# Patient Record
Sex: FEMALE | Race: BLACK OR AFRICAN AMERICAN | NOT HISPANIC OR LATINO | URBAN - METROPOLITAN AREA
[De-identification: names, ages, dates, MRNs, and addresses within clinical notes are randomized per-mention and may not be internally consistent; named-entity substitution may affect disease eponyms.]

---

## 2017-05-14 ENCOUNTER — EMERGENCY (EMERGENCY)
Facility: HOSPITAL | Age: 82
LOS: 1 days | Discharge: HOME | End: 2017-05-14
Attending: EMERGENCY MEDICINE
Payer: MEDICARE

## 2017-05-14 VITALS
DIASTOLIC BLOOD PRESSURE: 74 MMHG | SYSTOLIC BLOOD PRESSURE: 161 MMHG | OXYGEN SATURATION: 99 % | RESPIRATION RATE: 18 BRPM | HEART RATE: 82 BPM

## 2017-05-14 VITALS
SYSTOLIC BLOOD PRESSURE: 177 MMHG | RESPIRATION RATE: 22 BRPM | DIASTOLIC BLOOD PRESSURE: 95 MMHG | HEART RATE: 105 BPM | WEIGHT: 126.99 LBS | HEIGHT: 59 IN | TEMPERATURE: 98 F | OXYGEN SATURATION: 98 %

## 2017-05-14 DIAGNOSIS — R00.2 PALPITATIONS: ICD-10-CM

## 2017-05-14 DIAGNOSIS — I48.91 UNSPECIFIED ATRIAL FIBRILLATION: ICD-10-CM

## 2017-05-14 DIAGNOSIS — I10 ESSENTIAL (PRIMARY) HYPERTENSION: ICD-10-CM

## 2017-05-14 DIAGNOSIS — Z79.899 OTHER LONG TERM (CURRENT) DRUG THERAPY: ICD-10-CM

## 2017-05-14 LAB
ALBUMIN SERPL ELPH-MCNC: 4.2 G/DL — SIGNIFICANT CHANGE UP (ref 3.3–5.2)
ALP SERPL-CCNC: 73 U/L — SIGNIFICANT CHANGE UP (ref 40–120)
ALT FLD-CCNC: 20 U/L — SIGNIFICANT CHANGE UP
ANION GAP SERPL CALC-SCNC: 15 MMOL/L — SIGNIFICANT CHANGE UP (ref 5–17)
APTT BLD: 35.1 SEC — SIGNIFICANT CHANGE UP (ref 27.5–37.4)
AST SERPL-CCNC: 26 U/L — SIGNIFICANT CHANGE UP
BASOPHILS # BLD AUTO: 0 K/UL — SIGNIFICANT CHANGE UP (ref 0–0.2)
BASOPHILS NFR BLD AUTO: 0.2 % — SIGNIFICANT CHANGE UP (ref 0–2)
BILIRUB SERPL-MCNC: 0.4 MG/DL — SIGNIFICANT CHANGE UP (ref 0.4–2)
BUN SERPL-MCNC: 16 MG/DL — SIGNIFICANT CHANGE UP (ref 8–20)
CALCIUM SERPL-MCNC: 9.8 MG/DL — SIGNIFICANT CHANGE UP (ref 8.6–10.2)
CHLORIDE SERPL-SCNC: 104 MMOL/L — SIGNIFICANT CHANGE UP (ref 98–107)
CK SERPL-CCNC: 89 U/L — SIGNIFICANT CHANGE UP (ref 25–170)
CO2 SERPL-SCNC: 22 MMOL/L — SIGNIFICANT CHANGE UP (ref 22–29)
CREAT SERPL-MCNC: 0.83 MG/DL — SIGNIFICANT CHANGE UP (ref 0.5–1.3)
D DIMER BLD IA.RAPID-MCNC: <150 NG/ML DDU — SIGNIFICANT CHANGE UP
EOSINOPHIL # BLD AUTO: 0 K/UL — SIGNIFICANT CHANGE UP (ref 0–0.5)
EOSINOPHIL NFR BLD AUTO: 0.4 % — SIGNIFICANT CHANGE UP (ref 0–6)
GLUCOSE SERPL-MCNC: 144 MG/DL — HIGH (ref 70–115)
HCT VFR BLD CALC: 45.2 % — SIGNIFICANT CHANGE UP (ref 37–47)
HGB BLD-MCNC: 15.7 G/DL — SIGNIFICANT CHANGE UP (ref 12–16)
INR BLD: 1.15 RATIO — SIGNIFICANT CHANGE UP (ref 0.88–1.16)
LYMPHOCYTES # BLD AUTO: 1.3 K/UL — SIGNIFICANT CHANGE UP (ref 1–4.8)
LYMPHOCYTES # BLD AUTO: 15.9 % — LOW (ref 20–55)
MCHC RBC-ENTMCNC: 32.3 PG — HIGH (ref 27–31)
MCHC RBC-ENTMCNC: 34.7 G/DL — SIGNIFICANT CHANGE UP (ref 32–36)
MCV RBC AUTO: 93 FL — SIGNIFICANT CHANGE UP (ref 81–99)
MONOCYTES # BLD AUTO: 0.8 K/UL — SIGNIFICANT CHANGE UP (ref 0–0.8)
MONOCYTES NFR BLD AUTO: 9.5 % — SIGNIFICANT CHANGE UP (ref 3–10)
NEUTROPHILS # BLD AUTO: 6.2 K/UL — SIGNIFICANT CHANGE UP (ref 1.8–8)
NEUTROPHILS NFR BLD AUTO: 73.8 % — HIGH (ref 37–73)
NT-PROBNP SERPL-SCNC: 311 PG/ML — HIGH (ref 0–300)
PLATELET # BLD AUTO: 171 K/UL — SIGNIFICANT CHANGE UP (ref 150–400)
POTASSIUM SERPL-MCNC: 4.2 MMOL/L — SIGNIFICANT CHANGE UP (ref 3.5–5.3)
POTASSIUM SERPL-SCNC: 4.2 MMOL/L — SIGNIFICANT CHANGE UP (ref 3.5–5.3)
PROT SERPL-MCNC: 7.9 G/DL — SIGNIFICANT CHANGE UP (ref 6.6–8.7)
PROTHROM AB SERPL-ACNC: 12.7 SEC — SIGNIFICANT CHANGE UP (ref 9.8–12.7)
RBC # BLD: 4.86 M/UL — SIGNIFICANT CHANGE UP (ref 4.4–5.2)
RBC # FLD: 13.2 % — SIGNIFICANT CHANGE UP (ref 11–15.6)
SODIUM SERPL-SCNC: 141 MMOL/L — SIGNIFICANT CHANGE UP (ref 135–145)
TROPONIN T SERPL-MCNC: <0.01 NG/ML — SIGNIFICANT CHANGE UP (ref 0–0.06)
TROPONIN T SERPL-MCNC: <0.01 NG/ML — SIGNIFICANT CHANGE UP (ref 0–0.06)
TSH SERPL-MCNC: 1.13 UIU/ML — SIGNIFICANT CHANGE UP (ref 0.27–4.2)
WBC # BLD: 8.4 K/UL — SIGNIFICANT CHANGE UP (ref 4.8–10.8)
WBC # FLD AUTO: 8.4 K/UL — SIGNIFICANT CHANGE UP (ref 4.8–10.8)

## 2017-05-14 PROCEDURE — 85027 COMPLETE CBC AUTOMATED: CPT

## 2017-05-14 PROCEDURE — 82550 ASSAY OF CK (CPK): CPT

## 2017-05-14 PROCEDURE — 85379 FIBRIN DEGRADATION QUANT: CPT

## 2017-05-14 PROCEDURE — 85730 THROMBOPLASTIN TIME PARTIAL: CPT

## 2017-05-14 PROCEDURE — 99284 EMERGENCY DEPT VISIT MOD MDM: CPT

## 2017-05-14 PROCEDURE — 84443 ASSAY THYROID STIM HORMONE: CPT

## 2017-05-14 PROCEDURE — 93010 ELECTROCARDIOGRAM REPORT: CPT

## 2017-05-14 PROCEDURE — 84484 ASSAY OF TROPONIN QUANT: CPT

## 2017-05-14 PROCEDURE — 71045 X-RAY EXAM CHEST 1 VIEW: CPT

## 2017-05-14 PROCEDURE — 80053 COMPREHEN METABOLIC PANEL: CPT

## 2017-05-14 PROCEDURE — 83880 ASSAY OF NATRIURETIC PEPTIDE: CPT

## 2017-05-14 PROCEDURE — 85610 PROTHROMBIN TIME: CPT

## 2017-05-14 PROCEDURE — 71010: CPT | Mod: 26

## 2017-05-14 PROCEDURE — 99284 EMERGENCY DEPT VISIT MOD MDM: CPT | Mod: 25

## 2017-05-14 PROCEDURE — 93005 ELECTROCARDIOGRAM TRACING: CPT

## 2017-05-14 RX ORDER — METOPROLOL TARTRATE 50 MG
1 TABLET ORAL
Qty: 0 | Refills: 0 | COMMUNITY

## 2017-05-14 RX ORDER — SODIUM CHLORIDE 9 MG/ML
3 INJECTION INTRAMUSCULAR; INTRAVENOUS; SUBCUTANEOUS ONCE
Qty: 0 | Refills: 0 | Status: COMPLETED | OUTPATIENT
Start: 2017-05-14 | End: 2017-05-14

## 2017-05-14 RX ORDER — RIVAROXABAN 15 MG-20MG
1 KIT ORAL
Qty: 0 | Refills: 0 | COMMUNITY

## 2017-05-14 RX ORDER — EZETIMIBE 10 MG/1
1 TABLET ORAL
Qty: 0 | Refills: 0 | COMMUNITY

## 2017-05-14 RX ORDER — OLMESARTAN MEDOXOMIL 5 MG/1
1 TABLET, FILM COATED ORAL
Qty: 0 | Refills: 0 | COMMUNITY

## 2017-05-14 RX ORDER — DILTIAZEM HCL 120 MG
1 CAPSULE, EXT RELEASE 24 HR ORAL
Qty: 0 | Refills: 0 | COMMUNITY

## 2017-05-14 RX ADMIN — SODIUM CHLORIDE 3 MILLILITER(S): 9 INJECTION INTRAMUSCULAR; INTRAVENOUS; SUBCUTANEOUS at 14:21

## 2017-05-14 NOTE — ED PROVIDER NOTE - PROGRESS NOTE DETAILS
Case d/w covering cardiology Dr Morales from [facility] and recommend DC home and f/u with Dr Bates

## 2017-05-14 NOTE — ED ADULT NURSE NOTE - OBJECTIVE STATEMENT
pt received in [facility]. pt is awake alert oriented following commands and speaking coherently. pt received in er on CM displaying NSR at 98 and fluctuating between sinus tach of HR up to 105. pt reports feeling dizzy at home and felt palpitations. pt denies any cardiac hx. lung sounds cta bl. resp even and unlabored. in no apparent distress. denies nausea vomiting fever chills chest pain sob headache abdominal pain and urinary problems. ambulatory with steady gait

## 2017-05-14 NOTE — ED ADULT NURSE REASSESSMENT NOTE - NS ED NURSE REASSESS COMMENT FT1
pt to be discharged, a/o x 3, resp even and unlabored, vitals charted, resp even and unlabored, iv removed, ambulatory out of er with steady gait

## 2017-05-14 NOTE — ED PROVIDER NOTE - MEDICAL DECISION MAKING DETAILS
The patient appears well and Cardiology recommend DC home since the patient is on NSR and has paroxysmal a-fib and already on xeralto.  Will DC home and f/u with PMD tomorrow

## 2017-05-14 NOTE — ED PROVIDER NOTE - OBJECTIVE STATEMENT
The patient presents to ED complaining of palpitation and mild chest discomfort. No SOB, No Chest tightness, No Nausea, No Vomit, No diaphoresis, No abd pain, No back pain, No motor no sensory loss.

## 2017-05-14 NOTE — ED PROVIDER NOTE - CHPI ED SYMPTOMS NEG
no dizziness/no diaphoresis/no syncope/no back pain/no shortness of breath/no fever/no nausea/no chills/no cough

## 2018-09-28 ENCOUNTER — APPOINTMENT (OUTPATIENT)
Dept: NEUROLOGY | Facility: CLINIC | Age: 83
End: 2018-09-28
Payer: MEDICARE

## 2018-09-28 VITALS
HEIGHT: 59 IN | WEIGHT: 131 LBS | BODY MASS INDEX: 26.41 KG/M2 | SYSTOLIC BLOOD PRESSURE: 130 MMHG | DIASTOLIC BLOOD PRESSURE: 72 MMHG

## 2018-09-28 DIAGNOSIS — G45.0 VERTEBRO-BASILAR ARTERY SYNDROME: ICD-10-CM

## 2018-09-28 DIAGNOSIS — Z86.79 PERSONAL HISTORY OF OTHER DISEASES OF THE CIRCULATORY SYSTEM: ICD-10-CM

## 2018-09-28 DIAGNOSIS — R55 SYNCOPE AND COLLAPSE: ICD-10-CM

## 2018-09-28 DIAGNOSIS — Z86.39 PERSONAL HISTORY OF OTHER ENDOCRINE, NUTRITIONAL AND METABOLIC DISEASE: ICD-10-CM

## 2018-09-28 PROCEDURE — 99204 OFFICE O/P NEW MOD 45 MIN: CPT

## 2018-09-28 RX ORDER — RIVAROXABAN 20 MG/1
20 TABLET, FILM COATED ORAL
Refills: 0 | Status: ACTIVE | COMMUNITY

## 2018-09-28 RX ORDER — BIOTIN 10 MG
10000 TABLET ORAL
Refills: 0 | Status: ACTIVE | COMMUNITY

## 2018-09-28 RX ORDER — CHLORTHALIDONE 25 MG/1
25 TABLET ORAL
Refills: 0 | Status: ACTIVE | COMMUNITY

## 2018-09-28 RX ORDER — OLMESARTAN MEDOXOMIL 40 MG/1
40 TABLET, FILM COATED ORAL
Refills: 0 | Status: ACTIVE | COMMUNITY

## 2018-09-28 RX ORDER — DILTIAZEM HYDROCHLORIDE 120 MG/1
120 CAPSULE, EXTENDED RELEASE ORAL
Refills: 0 | Status: ACTIVE | COMMUNITY

## 2018-09-28 RX ORDER — METOPROLOL SUCCINATE 200 MG/1
200 TABLET, EXTENDED RELEASE ORAL
Refills: 0 | Status: ACTIVE | COMMUNITY

## 2018-09-28 RX ORDER — EZETIMIBE 10 MG/1
10 TABLET ORAL
Refills: 0 | Status: ACTIVE | COMMUNITY

## 2018-09-28 RX ORDER — CLONIDINE HYDROCHLORIDE 0.2 MG/1
0.2 TABLET ORAL
Refills: 0 | Status: ACTIVE | COMMUNITY

## 2018-10-03 ENCOUNTER — APPOINTMENT (OUTPATIENT)
Dept: NEUROLOGY | Facility: CLINIC | Age: 83
End: 2018-10-03
Payer: MEDICARE

## 2018-10-03 PROCEDURE — 93880 EXTRACRANIAL BILAT STUDY: CPT

## 2018-10-03 PROCEDURE — 93890: CPT

## 2018-10-03 PROCEDURE — 93886 INTRACRANIAL COMPLETE STUDY: CPT

## 2018-10-15 ENCOUNTER — APPOINTMENT (OUTPATIENT)
Dept: NEUROLOGY | Facility: CLINIC | Age: 83
End: 2018-10-15

## 2018-10-29 ENCOUNTER — APPOINTMENT (OUTPATIENT)
Dept: NEUROLOGY | Facility: CLINIC | Age: 83
End: 2018-10-29
Payer: MEDICARE

## 2018-10-29 PROCEDURE — 93040 RHYTHM ECG WITH REPORT: CPT

## 2018-10-29 PROCEDURE — 95819 EEG AWAKE AND ASLEEP: CPT

## 2023-02-13 ENCOUNTER — OFFICE (OUTPATIENT)
Dept: URBAN - METROPOLITAN AREA CLINIC 112 | Facility: CLINIC | Age: 88
Setting detail: OPHTHALMOLOGY
End: 2023-02-13
Payer: MEDICARE

## 2023-02-13 DIAGNOSIS — H04.122: ICD-10-CM

## 2023-02-13 DIAGNOSIS — H40.1123: ICD-10-CM

## 2023-02-13 DIAGNOSIS — H35.033: ICD-10-CM

## 2023-02-13 DIAGNOSIS — Z96.1: ICD-10-CM

## 2023-02-13 DIAGNOSIS — H04.121: ICD-10-CM

## 2023-02-13 DIAGNOSIS — H40.1113: ICD-10-CM

## 2023-02-13 DIAGNOSIS — H04.123: ICD-10-CM

## 2023-02-13 PROCEDURE — 92133 CPTRZD OPH DX IMG PST SGM ON: CPT | Performed by: OPHTHALMOLOGY

## 2023-02-13 PROCEDURE — 92014 COMPRE OPH EXAM EST PT 1/>: CPT | Performed by: OPHTHALMOLOGY

## 2023-02-13 PROCEDURE — 92083 EXTENDED VISUAL FIELD XM: CPT | Performed by: OPHTHALMOLOGY

## 2023-02-13 ASSESSMENT — KERATOMETRY
OS_K1POWER_DIOPTERS: 45.00
METHOD_AUTO_MANUAL: AUTO
OD_K2POWER_DIOPTERS: 46.75
OS_K2POWER_DIOPTERS: 46.75
OS_AXISANGLE_DEGREES: 157
OD_K1POWER_DIOPTERS: 45.00
OD_AXISANGLE_DEGREES: 013

## 2023-02-13 ASSESSMENT — REFRACTION_MANIFEST
OD_SPHERE: -0.25
OS_VA1: 20/25-
OS_ADD: +2.25
OS_VA1: 20/20
OD_ADD: +2.25
OS_CYLINDER: -100.00
OS_SPHERE: +0.25
OS_AXIS: 068
OD_CYLINDER: SPH
OS_CYLINDER: -0.50
OS_AXIS: 070
OS_SPHERE: PLANO
OD_VA1: 20/20

## 2023-02-13 ASSESSMENT — CONFRONTATIONAL VISUAL FIELD TEST (CVF)
OS_FINDINGS: FULL
OD_FINDINGS: FULL

## 2023-02-13 ASSESSMENT — REFRACTION_CURRENTRX
OD_OVR_VA: 20/
OD_ADD: +2.25
OD_VPRISM_DIRECTION: BF
OS_AXIS: 071
OS_ADD: +2.25
OD_SPHERE: -0.25
OD_AXIS: 0
OS_OVR_VA: 20/
OS_VPRISM_DIRECTION: BF
OS_SPHERE: PLANO
OD_CYLINDER: SPH
OS_CYLINDER: -0.50

## 2023-02-13 ASSESSMENT — AXIALLENGTH_DERIVED
OS_AL: 109.53
OS_AL: 23.4029
OD_AL: 22.796

## 2023-02-13 ASSESSMENT — CORNEAL DYSTROPHY - POSTERIOR
OD_POSTERIORDYSTROPHY: 1+
OS_POSTERIORDYSTROPHY: 1+

## 2023-02-13 ASSESSMENT — SPHEQUIV_DERIVED
OS_SPHEQUIV: -1.75
OS_SPHEQUIV: -49.75
OD_SPHEQUIV: -0.125

## 2023-02-13 ASSESSMENT — PACHYMETRY
OD_CT_UM: 553
OS_CT_UM: 553
OS_CT_CORRECTION: -1
OD_CT_CORRECTION: -1

## 2023-02-13 ASSESSMENT — REFRACTION_AUTOREFRACTION
OD_AXIS: 105
OS_CYLINDER: -1.50
OD_SPHERE: +1.50
OS_SPHERE: -1.00
OS_AXIS: 064
OD_CYLINDER: -3.25

## 2023-02-13 ASSESSMENT — SUPERFICIAL PUNCTATE KERATITIS (SPK)
OD_SPK: 1+
OS_SPK: 1+

## 2023-02-13 ASSESSMENT — VISUAL ACUITY
OS_BCVA: 20/25-1
OD_BCVA: 20/30

## 2023-02-13 ASSESSMENT — TONOMETRY
OD_IOP_MMHG: 15
OS_IOP_MMHG: 12

## 2023-06-09 ENCOUNTER — OFFICE (OUTPATIENT)
Dept: URBAN - METROPOLITAN AREA CLINIC 112 | Facility: CLINIC | Age: 88
Setting detail: OPHTHALMOLOGY
End: 2023-06-09
Payer: MEDICARE

## 2023-06-09 DIAGNOSIS — Z96.1: ICD-10-CM

## 2023-06-09 DIAGNOSIS — H40.1113: ICD-10-CM

## 2023-06-09 DIAGNOSIS — H40.1123: ICD-10-CM

## 2023-06-09 DIAGNOSIS — H35.033: ICD-10-CM

## 2023-06-09 DIAGNOSIS — H04.123: ICD-10-CM

## 2023-06-09 PROCEDURE — 92250 FUNDUS PHOTOGRAPHY W/I&R: CPT | Performed by: OPHTHALMOLOGY

## 2023-06-09 PROCEDURE — 92012 INTRM OPH EXAM EST PATIENT: CPT | Performed by: OPHTHALMOLOGY

## 2023-06-09 ASSESSMENT — PACHYMETRY
OS_CT_UM: 553
OD_CT_CORRECTION: -1
OS_CT_CORRECTION: -1
OD_CT_UM: 553

## 2023-06-09 ASSESSMENT — REFRACTION_CURRENTRX
OD_VPRISM_DIRECTION: BF
OD_AXIS: 0
OS_SPHERE: PLANO
OD_CYLINDER: SPH
OD_OVR_VA: 20/
OD_SPHERE: -0.25
OS_AXIS: 071
OD_ADD: +2.25
OS_OVR_VA: 20/
OS_CYLINDER: -0.50
OS_VPRISM_DIRECTION: BF
OS_ADD: +2.25

## 2023-06-09 ASSESSMENT — REFRACTION_MANIFEST
OS_CYLINDER: -0.50
OS_CYLINDER: -100.00
OD_VA1: 20/20
OS_AXIS: 070
OS_SPHERE: +0.25
OD_ADD: +2.25
OD_CYLINDER: SPH
OS_SPHERE: PLANO
OS_ADD: +2.25
OS_VA1: 20/25-
OD_SPHERE: -0.25
OS_AXIS: 068
OS_VA1: 20/20

## 2023-06-09 ASSESSMENT — VISUAL ACUITY
OD_BCVA: 20/50
OS_BCVA: 20/25

## 2023-06-09 ASSESSMENT — KERATOMETRY
OD_AXISANGLE_DEGREES: 003
OS_K1POWER_DIOPTERS: 45.00
METHOD_AUTO_MANUAL: AUTO
OS_AXISANGLE_DEGREES: 162
OS_K2POWER_DIOPTERS: 47.25
OD_K2POWER_DIOPTERS: 46.75
OD_K1POWER_DIOPTERS: 45.50

## 2023-06-09 ASSESSMENT — REFRACTION_AUTOREFRACTION
OS_SPHERE: UTP
OS_CYLINDER: UTP
OD_CYLINDER: -1.50
OD_AXIS: 101
OS_AXIS: UTP
OD_SPHERE: -0.50

## 2023-06-09 ASSESSMENT — CORNEAL DYSTROPHY - POSTERIOR
OD_POSTERIORDYSTROPHY: 1+
OS_POSTERIORDYSTROPHY: 1+

## 2023-06-09 ASSESSMENT — SPHEQUIV_DERIVED
OS_SPHEQUIV: -49.75
OD_SPHEQUIV: -1.25

## 2023-06-09 ASSESSMENT — SUPERFICIAL PUNCTATE KERATITIS (SPK)
OD_SPK: 1+
OS_SPK: 1+

## 2023-06-09 ASSESSMENT — TONOMETRY
OS_IOP_MMHG: 15
OD_IOP_MMHG: 15

## 2023-06-09 ASSESSMENT — AXIALLENGTH_DERIVED
OS_AL: 107.59
OD_AL: 23.1242

## 2023-06-09 ASSESSMENT — CONFRONTATIONAL VISUAL FIELD TEST (CVF)
OD_FINDINGS: FULL
OS_FINDINGS: FULL

## 2023-12-12 ENCOUNTER — OFFICE (OUTPATIENT)
Dept: URBAN - METROPOLITAN AREA CLINIC 63 | Facility: CLINIC | Age: 88
Setting detail: OPHTHALMOLOGY
End: 2023-12-12
Payer: MEDICARE

## 2023-12-12 DIAGNOSIS — H04.121: ICD-10-CM

## 2023-12-12 DIAGNOSIS — H04.122: ICD-10-CM

## 2023-12-12 DIAGNOSIS — H35.033: ICD-10-CM

## 2023-12-12 DIAGNOSIS — H40.1113: ICD-10-CM

## 2023-12-12 DIAGNOSIS — Z96.1: ICD-10-CM

## 2023-12-12 DIAGNOSIS — H40.1123: ICD-10-CM

## 2023-12-12 DIAGNOSIS — H04.123: ICD-10-CM

## 2023-12-12 PROCEDURE — 99213 OFFICE O/P EST LOW 20 MIN: CPT | Performed by: OPHTHALMOLOGY

## 2023-12-12 ASSESSMENT — REFRACTION_CURRENTRX
OD_ADD: +2.25
OS_ADD: +2.25
OS_CYLINDER: -0.50
OS_VPRISM_DIRECTION: BF
OS_AXIS: 071
OS_OVR_VA: 20/
OS_SPHERE: PLANO
OD_OVR_VA: 20/
OD_CYLINDER: SPH
OD_SPHERE: -0.25
OD_AXIS: 0
OD_VPRISM_DIRECTION: BF

## 2023-12-12 ASSESSMENT — REFRACTION_MANIFEST
OD_CYLINDER: SPH
OS_SPHERE: PLANO
OD_VA1: 20/20
OS_CYLINDER: -100.00
OD_ADD: +2.25
OS_AXIS: 070
OS_VA1: 20/25-
OS_VA1: 20/20
OS_CYLINDER: -0.50
OS_SPHERE: +0.25
OS_AXIS: 068
OD_SPHERE: -0.25
OS_ADD: +2.25

## 2023-12-12 ASSESSMENT — CORNEAL DYSTROPHY - POSTERIOR
OD_POSTERIORDYSTROPHY: 1+
OS_POSTERIORDYSTROPHY: 1+

## 2023-12-12 ASSESSMENT — CONFRONTATIONAL VISUAL FIELD TEST (CVF)
OD_FINDINGS: FULL
OS_FINDINGS: FULL

## 2023-12-12 ASSESSMENT — SUPERFICIAL PUNCTATE KERATITIS (SPK)
OS_SPK: 1+
OD_SPK: 1+

## 2023-12-12 ASSESSMENT — SPHEQUIV_DERIVED: OS_SPHEQUIV: -49.75

## 2023-12-12 ASSESSMENT — REFRACTION_AUTOREFRACTION
OD_SPHERE: UTP
OS_SPHERE: UTP

## 2024-04-12 ENCOUNTER — OFFICE (OUTPATIENT)
Dept: URBAN - METROPOLITAN AREA CLINIC 112 | Facility: CLINIC | Age: 89
Setting detail: OPHTHALMOLOGY
End: 2024-04-12
Payer: MEDICARE

## 2024-04-12 DIAGNOSIS — H40.1123: ICD-10-CM

## 2024-04-12 DIAGNOSIS — H04.123: ICD-10-CM

## 2024-04-12 DIAGNOSIS — Z96.1: ICD-10-CM

## 2024-04-12 DIAGNOSIS — H35.033: ICD-10-CM

## 2024-04-12 DIAGNOSIS — H40.1113: ICD-10-CM

## 2024-04-12 PROCEDURE — 92083 EXTENDED VISUAL FIELD XM: CPT | Performed by: OPHTHALMOLOGY

## 2024-04-12 PROCEDURE — 92020 GONIOSCOPY: CPT | Performed by: OPHTHALMOLOGY

## 2024-04-12 PROCEDURE — 92133 CPTRZD OPH DX IMG PST SGM ON: CPT | Performed by: OPHTHALMOLOGY

## 2024-04-12 PROCEDURE — 92014 COMPRE OPH EXAM EST PT 1/>: CPT | Performed by: OPHTHALMOLOGY

## 2024-08-14 NOTE — ED PROVIDER NOTE - PSYCHIATRIC NEGATIVE STATEMENT, MLM
8-14-24 - Per Dr. Raheem Middleton MD, advised that patient should go to the ER. Stated that it will take too long to get in with specialist.     8-14-24 @1:07 pm - Call placed to patient, call went straight to voice mail, left patient a detailed message advising of Dr. Middleton's recommendation to go to ER. Advised patient to call office to confirm message was received.   Mckenzie SILVERIO LPN   no known mental health issues.

## 2024-08-19 ENCOUNTER — OFFICE (OUTPATIENT)
Dept: URBAN - METROPOLITAN AREA CLINIC 112 | Facility: CLINIC | Age: 89
Setting detail: OPHTHALMOLOGY
End: 2024-08-19
Payer: MEDICARE

## 2024-08-19 DIAGNOSIS — H40.1113: ICD-10-CM

## 2024-08-19 DIAGNOSIS — Z96.1: ICD-10-CM

## 2024-08-19 DIAGNOSIS — H40.1123: ICD-10-CM

## 2024-08-19 DIAGNOSIS — H35.033: ICD-10-CM

## 2024-08-19 DIAGNOSIS — H04.123: ICD-10-CM

## 2024-08-19 PROCEDURE — 92250 FUNDUS PHOTOGRAPHY W/I&R: CPT | Performed by: OPHTHALMOLOGY

## 2024-08-19 PROCEDURE — 92012 INTRM OPH EXAM EST PATIENT: CPT | Performed by: OPHTHALMOLOGY

## 2024-08-19 ASSESSMENT — CONFRONTATIONAL VISUAL FIELD TEST (CVF)
OS_FINDINGS: FULL
OD_FINDINGS: FULL

## 2024-09-10 ENCOUNTER — OFFICE (OUTPATIENT)
Dept: URBAN - METROPOLITAN AREA CLINIC 94 | Facility: CLINIC | Age: 89
Setting detail: OPHTHALMOLOGY
End: 2024-09-10
Payer: MEDICARE

## 2024-09-10 DIAGNOSIS — H43.813: ICD-10-CM

## 2024-09-10 DIAGNOSIS — H43.393: ICD-10-CM

## 2024-09-10 PROCEDURE — 92134 CPTRZ OPH DX IMG PST SGM RTA: CPT | Performed by: OPHTHALMOLOGY

## 2024-09-10 PROCEDURE — 92012 INTRM OPH EXAM EST PATIENT: CPT | Performed by: OPHTHALMOLOGY

## 2024-12-16 ENCOUNTER — OFFICE (OUTPATIENT)
Dept: URBAN - METROPOLITAN AREA CLINIC 112 | Facility: CLINIC | Age: 89
Setting detail: OPHTHALMOLOGY
End: 2024-12-16
Payer: MEDICARE

## 2024-12-16 DIAGNOSIS — H40.1113: ICD-10-CM

## 2024-12-16 DIAGNOSIS — H04.122: ICD-10-CM

## 2024-12-16 DIAGNOSIS — H04.121: ICD-10-CM

## 2024-12-16 DIAGNOSIS — H35.033: ICD-10-CM

## 2024-12-16 DIAGNOSIS — H04.123: ICD-10-CM

## 2024-12-16 DIAGNOSIS — H40.1123: ICD-10-CM

## 2024-12-16 PROCEDURE — 92012 INTRM OPH EXAM EST PATIENT: CPT | Performed by: OPHTHALMOLOGY

## 2024-12-16 ASSESSMENT — SUPERFICIAL PUNCTATE KERATITIS (SPK)
OD_SPK: 1+
OS_SPK: 1+

## 2024-12-16 ASSESSMENT — REFRACTION_AUTOREFRACTION
OD_SPHERE: +0.75
OS_SPHERE: UTP
OD_AXIS: 101
OD_CYLINDER: -2.25

## 2024-12-16 ASSESSMENT — CONFRONTATIONAL VISUAL FIELD TEST (CVF)
OS_FINDINGS: FULL
OD_FINDINGS: FULL

## 2024-12-16 ASSESSMENT — VISUAL ACUITY
OD_BCVA: 20/30-2
OS_BCVA: 20/30-1

## 2024-12-16 ASSESSMENT — CORNEAL DYSTROPHY - POSTERIOR
OS_POSTERIORDYSTROPHY: 1+
OD_POSTERIORDYSTROPHY: 1+

## 2024-12-16 ASSESSMENT — PACHYMETRY
OS_CT_UM: 553
OS_CT_CORRECTION: -1
OD_CT_CORRECTION: -1
OD_CT_UM: 553

## 2024-12-16 ASSESSMENT — KERATOMETRY
OS_AXISANGLE_DEGREES: 164
METHOD_AUTO_MANUAL: AUTO
OD_K2POWER_DIOPTERS: 46.50
OD_AXISANGLE_DEGREES: 013
OS_K2POWER_DIOPTERS: 47.25
OS_K1POWER_DIOPTERS: 45.00
OD_K1POWER_DIOPTERS: 45.25

## 2024-12-16 ASSESSMENT — TONOMETRY
OD_IOP_MMHG: 14
OS_IOP_MMHG: 14

## 2025-04-21 ENCOUNTER — OFFICE (OUTPATIENT)
Dept: URBAN - METROPOLITAN AREA CLINIC 112 | Facility: CLINIC | Age: 89
Setting detail: OPHTHALMOLOGY
End: 2025-04-21
Payer: MEDICARE

## 2025-04-21 DIAGNOSIS — H35.033: ICD-10-CM

## 2025-04-21 DIAGNOSIS — H40.1123: ICD-10-CM

## 2025-04-21 DIAGNOSIS — H04.123: ICD-10-CM

## 2025-04-21 DIAGNOSIS — H40.1113: ICD-10-CM

## 2025-04-21 PROCEDURE — 92014 COMPRE OPH EXAM EST PT 1/>: CPT | Performed by: OPHTHALMOLOGY

## 2025-04-21 PROCEDURE — 92133 CPTRZD OPH DX IMG PST SGM ON: CPT | Performed by: OPHTHALMOLOGY

## 2025-04-21 PROCEDURE — 92083 EXTENDED VISUAL FIELD XM: CPT | Performed by: OPHTHALMOLOGY

## 2025-04-21 ASSESSMENT — PACHYMETRY
OS_CT_CORRECTION: -1
OS_CT_UM: 553
OD_CT_CORRECTION: -1
OD_CT_UM: 553

## 2025-04-21 ASSESSMENT — REFRACTION_AUTOREFRACTION
OD_AXIS: 066
OD_CYLINDER: -1.25
OD_SPHERE: -3.00
OS_SPHERE: UTP

## 2025-04-21 ASSESSMENT — VISUAL ACUITY
OS_BCVA: 20/25
OD_BCVA: 20/40+1

## 2025-04-21 ASSESSMENT — CONFRONTATIONAL VISUAL FIELD TEST (CVF)
OD_FINDINGS: FULL
OS_FINDINGS: FULL

## 2025-04-21 ASSESSMENT — SUPERFICIAL PUNCTATE KERATITIS (SPK)
OS_SPK: 1+
OD_SPK: 1+

## 2025-04-21 ASSESSMENT — KERATOMETRY
OS_AXISANGLE_DEGREES: 158
OS_K2POWER_DIOPTERS: 47.25
OS_K1POWER_DIOPTERS: 45.00
METHOD_AUTO_MANUAL: AUTO
OD_K1POWER_DIOPTERS: UTP

## 2025-04-21 ASSESSMENT — CORNEAL DYSTROPHY - POSTERIOR
OS_POSTERIORDYSTROPHY: 1+
OD_POSTERIORDYSTROPHY: 1+

## 2025-04-21 ASSESSMENT — TONOMETRY
OD_IOP_MMHG: 14
OS_IOP_MMHG: 14